# Patient Record
(demographics unavailable — no encounter records)

---
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
## 2025-06-12 NOTE — PHYSICAL EXAM
[de-identified] : Mr. TYRONE ZENG is sitting comfortably in the exam room  LEFT shoulder -Skin is intact, mild swelling, ecchymosis -ROM limited due to pain -Able to make a fist -Sensation is intact median, radial, ulnar, axillary nerves -Motor is intact median, radial, ulnar, axillary nerves -Hand is warm and well-perfused, Palpable radial and ulnar pulses  [de-identified] : X-rays show a comminuted clavicle fracture at the junction between the middle and distal third  ACC: [id number] EXAM: XR CLAVICLE COMPLETE LT ORDERED BY: MARK PATINO  ACC: [id number] EXAM: XR HUMERUS MIN 2 VIEWS LT ORDERED BY: MARK PATINO  PROCEDURE DATE: 06/09/2025  INTERPRETATION: CLINICAL INFORMATION: Pain.  COMPARISON: None available.  TECHNIQUE: 2 views of the left humerus, 3 views of the left shoulder, 2 views of the left clavicle  FINDINGS: Acute comminuted oblique transverse fracture through the navicular mid to distal shaft near the coracoclavicular interval, which is not widened. Approximately two full bone widths caudal displacement of the dominant distal clavicular shaft fragment. No dislocation. Joint spaces are anatomic in alignment. Bony mineralization within normal limits.  IMPRESSION: Acute comminuted and displaced left clavicular mid to distal shaft fracture.  --- End of Report ---  JAROD TAYLOR MD; Attending Radiologist This document has been electronically signed. Jun 9 2025 2:17PM
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
## 2025-06-12 NOTE — PHYSICAL EXAM
[de-identified] : Mr. TYRONE ZENG is sitting comfortably in the exam room  LEFT shoulder -Skin is intact, mild swelling, ecchymosis -ROM limited due to pain -Able to make a fist -Sensation is intact median, radial, ulnar, axillary nerves -Motor is intact median, radial, ulnar, axillary nerves -Hand is warm and well-perfused, Palpable radial and ulnar pulses  [de-identified] : X-rays show a comminuted clavicle fracture at the junction between the middle and distal third  ACC: [id number] EXAM: XR CLAVICLE COMPLETE LT ORDERED BY: MARK PATINO  ACC: [id number] EXAM: XR HUMERUS MIN 2 VIEWS LT ORDERED BY: MARK PATINO  PROCEDURE DATE: 06/09/2025  INTERPRETATION: CLINICAL INFORMATION: Pain.  COMPARISON: None available.  TECHNIQUE: 2 views of the left humerus, 3 views of the left shoulder, 2 views of the left clavicle  FINDINGS: Acute comminuted oblique transverse fracture through the navicular mid to distal shaft near the coracoclavicular interval, which is not widened. Approximately two full bone widths caudal displacement of the dominant distal clavicular shaft fragment. No dislocation. Joint spaces are anatomic in alignment. Bony mineralization within normal limits.  IMPRESSION: Acute comminuted and displaced left clavicular mid to distal shaft fracture.  --- End of Report ---  JAROD TAYLOR MD; Attending Radiologist This document has been electronically signed. Jun 9 2025 2:17PM
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
## 2025-07-03 NOTE — HISTORY OF PRESENT ILLNESS
[de-identified] : Mr. TYRONE ZENG is a 32 year old gentleman presenting for post-op appointment s/p ORIF left clavicle on 6/16/25. Since his surgery he states he is feeling better. He has been working on some range of motion exercises at home.

## 2025-07-03 NOTE — DISCUSSION/SUMMARY
[de-identified] : 32-year-old gentleman s/p ORIF left clavicle, approximately 2 weeks out  -X-ray and physical exam findings were discussed with the patient -NWB left UE -Discontinue sling -Physical therapy: work on ROM left shoulder -Follow up in 4 weeks with x-rays of the left clavicle at that time. -All the patient's questions and concerns were addressed during this visit

## 2025-07-03 NOTE — HISTORY OF PRESENT ILLNESS
[de-identified] : Mr. TYRONE ZENG is a 32 year old gentleman presenting for post-op appointment s/p ORIF left clavicle on 6/16/25. Since his surgery he states he is feeling better. He has been working on some range of motion exercises at home.

## 2025-07-03 NOTE — PHYSICAL EXAM
[de-identified] : Mr. TYRONE ZENG is sitting comfortably in the exam room  LEFT shoulder -Skin is intact, no swelling, no ecchymosis -Incision is clean and dry, no erythema, no signs of infection -FE:130 , ER: 10, IR: L5 , ABD: 90 -Able to make a fist -Sensation is intact median, radial, ulnar, axillary nerves -Motor is intact median, radial, ulnar, axillary nerves -Hand is warm and well-perfused, Palpable radial and ulnar pulses  [de-identified] : No Xrays were taken in the office today, 7/2/25

## 2025-07-03 NOTE — REASON FOR VISIT
[Post Operative Visit] : a post operative visit for [FreeTextEntry2] : s/p ORIF left clavicle, DOS: 6/16/25

## 2025-07-03 NOTE — DISCUSSION/SUMMARY
[de-identified] : 32-year-old gentleman s/p ORIF left clavicle, approximately 2 weeks out  -X-ray and physical exam findings were discussed with the patient -NWB left UE -Discontinue sling -Physical therapy: work on ROM left shoulder -Follow up in 4 weeks with x-rays of the left clavicle at that time. -All the patient's questions and concerns were addressed during this visit

## 2025-07-03 NOTE — PHYSICAL EXAM
[de-identified] : Mr. TYRONE ZENG is sitting comfortably in the exam room  LEFT shoulder -Skin is intact, no swelling, no ecchymosis -Incision is clean and dry, no erythema, no signs of infection -FE:130 , ER: 10, IR: L5 , ABD: 90 -Able to make a fist -Sensation is intact median, radial, ulnar, axillary nerves -Motor is intact median, radial, ulnar, axillary nerves -Hand is warm and well-perfused, Palpable radial and ulnar pulses  [de-identified] : No Xrays were taken in the office today, 7/2/25